# Patient Record
Sex: FEMALE | Race: WHITE
[De-identification: names, ages, dates, MRNs, and addresses within clinical notes are randomized per-mention and may not be internally consistent; named-entity substitution may affect disease eponyms.]

---

## 2020-12-30 ENCOUNTER — HOSPITAL ENCOUNTER (OUTPATIENT)
Dept: HOSPITAL 47 - ORWHC2ENDO | Age: 40
Discharge: HOME | End: 2020-12-30
Attending: INTERNAL MEDICINE
Payer: COMMERCIAL

## 2020-12-30 VITALS — RESPIRATION RATE: 16 BRPM | TEMPERATURE: 98.3 F

## 2020-12-30 VITALS — DIASTOLIC BLOOD PRESSURE: 90 MMHG | SYSTOLIC BLOOD PRESSURE: 125 MMHG | HEART RATE: 71 BPM

## 2020-12-30 VITALS — BODY MASS INDEX: 31.8 KG/M2

## 2020-12-30 DIAGNOSIS — K52.9: Primary | ICD-10-CM

## 2020-12-30 DIAGNOSIS — E83.42: ICD-10-CM

## 2020-12-30 DIAGNOSIS — E87.6: ICD-10-CM

## 2020-12-30 DIAGNOSIS — Z98.890: ICD-10-CM

## 2020-12-30 DIAGNOSIS — K21.9: ICD-10-CM

## 2020-12-30 PROCEDURE — 88305 TISSUE EXAM BY PATHOLOGIST: CPT

## 2020-12-30 PROCEDURE — 45380 COLONOSCOPY AND BIOPSY: CPT

## 2020-12-30 PROCEDURE — 81025 URINE PREGNANCY TEST: CPT

## 2020-12-30 NOTE — P.PCN
Date of Procedure: 12/30/20


Procedure(s) Performed: 


BRIEF HISTORY: Patient is a 40-year-old pleasant white female scheduled for an 

elective colonoscopy as a part of evaluation of chronic diarrhea for the last 

several months duration.  She has bowel movements anywhere from 4-6 a day which 

are loose to watery in consistency.  No blood or mucus in the stool.





PROCEDURE PERFORMED: Colonoscopy with biopsy. 





PREOPERATIVE DIAGNOSIS: Chronic diarrhea. 





IV sedation per Anesthesia. 





PROCEDURE: After informed consent was obtained, the patient, was brought into 

the endoscopy unit. IV sedation was administered by Anesthesia under continuous 

monitoring.  Digital rectal examination was normal. Initially the Olympus CF-160

flexible video colonoscope was then inserted in the rectum, gradually advanced 

into the cecum without any difficulty. Careful examination was performed as the 

scope was gradually being withdrawn. Ileocecal valve and the appendiceal orifice

were visualized and appeared normal.  Prep was excellent. Mucosa of the cecum, 

ascending colon, transverse colon, descending colon, sigmoid colon, and rectum 

appeared normal.  Random biopsies were done from the transverse colon and 

descending colon to rule out microscopic/collagenous colitis.  Retroflexion was 

performed in the rectum and no lesions were seen. The patient tolerated the 

procedure well. 





IMPRESSION: Normal-appearing colon from rectum to cecum with no evidence of 

colitis or colorectal neoplasia .





RECOMMENDATIONS:  Findings of this examination were discussed with the patient 

as well as her family.  She was advised to follow with the biopsy results.  She 

will be seen in office in 2-3 weeks.

## 2021-01-13 ENCOUNTER — HOSPITAL ENCOUNTER (OUTPATIENT)
Dept: HOSPITAL 47 - LABWHC1 | Age: 41
Discharge: HOME | End: 2021-01-13
Attending: INTERNAL MEDICINE
Payer: COMMERCIAL

## 2021-01-13 DIAGNOSIS — K52.9: Primary | ICD-10-CM

## 2021-01-13 LAB
BASOPHILS # BLD AUTO: 0 K/UL (ref 0–0.2)
BASOPHILS NFR BLD AUTO: 0 %
EOSINOPHIL # BLD AUTO: 0.1 K/UL (ref 0–0.7)
EOSINOPHIL NFR BLD AUTO: 1 %
ERYTHROCYTE [DISTWIDTH] IN BLOOD BY AUTOMATED COUNT: 4.69 M/UL (ref 3.8–5.4)
ERYTHROCYTE [DISTWIDTH] IN BLOOD: 13.3 % (ref 11.5–15.5)
GLIADIN IGA SER-ACNC: 0.6 U/ML
GLIADIN PEPTIDE IGA SER-ACNC: NEGATIVE
GLIADIN PEPTIDE IGG SER-ACNC: NEGATIVE
HCT VFR BLD AUTO: 42.8 % (ref 34–46)
HGB BLD-MCNC: 14.5 GM/DL (ref 11.4–16)
LYMPHOCYTES # SPEC AUTO: 2.6 K/UL (ref 1–4.8)
LYMPHOCYTES NFR SPEC AUTO: 28 %
MCH RBC QN AUTO: 31 PG (ref 25–35)
MCHC RBC AUTO-ENTMCNC: 34 G/DL (ref 31–37)
MCV RBC AUTO: 91.1 FL (ref 80–100)
MONOCYTES # BLD AUTO: 0.6 K/UL (ref 0–1)
MONOCYTES NFR BLD AUTO: 7 %
NEUTROPHILS # BLD AUTO: 5.8 K/UL (ref 1.3–7.7)
NEUTROPHILS NFR BLD AUTO: 63 %
PLATELET # BLD AUTO: 246 K/UL (ref 150–450)
WBC # BLD AUTO: 9.2 K/UL (ref 3.8–10.6)

## 2021-01-13 PROCEDURE — 85652 RBC SED RATE AUTOMATED: CPT

## 2021-01-13 PROCEDURE — 83516 IMMUNOASSAY NONANTIBODY: CPT

## 2021-01-13 PROCEDURE — 36415 COLL VENOUS BLD VENIPUNCTURE: CPT

## 2021-01-13 PROCEDURE — 85025 COMPLETE CBC W/AUTO DIFF WBC: CPT
